# Patient Record
Sex: FEMALE | Race: WHITE
[De-identification: names, ages, dates, MRNs, and addresses within clinical notes are randomized per-mention and may not be internally consistent; named-entity substitution may affect disease eponyms.]

---

## 2019-04-03 NOTE — XMS
PreManage Notification: TEJA ECHOLS MRN:[id number]
 
Security Information
 
Security Events
No recent Security Events currently on file
 
 
 
CRITERIA MET
------------
- [facility]
 
 
CARE PROVIDERS
-------------------------------------------------------------------------------------
PRECIOUS WALTERS     Nurse Practitioner: Women's Health     Current
 
PHONE: [phone]
-------------------------------------------------------------------------------------
[facility]       Primary Care     2012-[facility]
 
PHONE: [phone]
-------------------------------------------------------------------------------------
Critical access hospital         Primary Care     [facility]
 
PHONE: [phone]
-------------------------------------------------------------------------------------
 
Guidelines Source: Merged with [facility]
Guidelines Date: 2016
 
Care Recommendation:
    [facility] EMERGENCY DEPARTMENT CARE 
    RECOMMENDATIONS FOR TEJA AAngelica ECHOLS, : 1995:     
    -Refer patient back to primary care provider for management of chronic 
    conditions or advise establishment of care with a primary care provider (see ED
    Transition Coordinator for resources).
    -*If patient has not been able to establish with a primary care provider, 
    provide brochure for [facility] walk-in clinics in [facility].
    -Consider the patient's recent work-ups when ordering lab and imaging tests. 
    Ionizing radiation studies, particularly CT scans of the head or abdomen
    shouldbe considered on a case by case basis, with careful review of new or
    changed symptoms and recent work-ups.
    -If patient is homeless, provide copies of Homeless Resources from the 
    Emergency Department web pages.
    -During every ED visit, confirm patients current home/mobile numbers in case 
    need to contact for laboratory results or for an RN discharge call.
 
 
E.D. VISIT COUNT (12 MO.)
-------------------------------------------------------------------------------------
2 [facility]
-------------------------------------------------------------------------------------
TOTAL 2
-------------------------------------------------------------------------------------
 
NOTE: Visits indicate total known visits.
 
ED/C VISIT TRACKING (12 MO.)
-------------------------------------------------------------------------------------
2019 00:34
[facility]
 
TYPE: Emergency
 
COMPLAINT:
- FACIAL RASH/VOMITING
-------------------------------------------------------------------------------------
2018 09:06
[facility]
 
TYPE: Emergency
 
COMPLAINT:
- LEG PAIN/NUMBNESS
 
DIAGNOSES:
- Lumbago with sciatica, left side
- Nicotine dependence, unspecified, uncomplicated
- Anesthesia of skin
-------------------------------------------------------------------------------------
 
 
INPATIENT VISIT TRACKING (12 MO.)
No inpatient visits to display in this time frame
 
[email or web address]

## 2020-03-16 NOTE — XMS
PreManage Notification: TEJA ECHOLS MRN:[id number]
 
Security Information
 
Security Events
No recent Security Events currently on file
 
 
 
CRITERIA MET
------------
- [facility]
 
 
CARE PROVIDERS
-------------------------------------------------------------------------------------
[facility] Federally Qualified        05/19/2012-Current
[facility]
 
PHONE: [phone]
-------------------------------------------------------------------------------------
[facility]er[facility] Qual[facility]        05/19/2012-Current
[facility]
 
PHONE: Unknown
-------------------------------------------------------------------------------------
PRECIOUS WALTERS      Nurse Practitioner: Women's Health     Current
 
PHONE: [phone]
-------------------------------------------------------------------------------------
[facility]       Primary Care     05/19/2012-Current
[facility]
 
PHONE: [phone]
-------------------------------------------------------------------------------------
[facility]
 
PHONE: [phone]
-------------------------------------------------------------------------------------
 
-------------------------------------------------------------------------
Care Guidelines exist for the following facilities:    
[facility] ( 07/08/2019 )
Care History
Medical/Surgical
04/03/2019    [facility]
 
      - PATIENT DOES NOT HAVE A PCP- PATIENT CANCELLED APT TO ESTABLISH CARE ON 01/ 30/19 WITH DR VILLA.
      - PLEASE REFER PATIENT TO THE WALK IN CLINIC FOR NON EMERGENT MEDICAL NEEDS. 
      - PLEASE PROVIDE W CONTACT NUMBER [phone].
E.D. VISIT COUNT (12 MO.)
-------------------------------------------------------------------------------------
2 [facility]
-------------------------------------------------------------------------------------
TOTAL 2
-------------------------------------------------------------------------------------
NOTE: Visits indicate total known visits.
 
 
ED/UCC VISIT TRACKING (12 MO.)
-------------------------------------------------------------------------------------
03/16/2020 11:30
[facility]
 
TYPE: Emergency
 
COMPLAINT:
- FEVER, COUGH
-------------------------------------------------------------------------------------
04/03/2019 00:34
[facility] [place]
 
TYPE: Emergency
 
COMPLAINT:
- FEVER FACIAL RASH/VOMITING
 
DIAGNOSES:
- Major depressive disorder, single episode, unspecified
- Fever, unspecified
- Urinary tract infection, site not specified
- Nausea with vomiting, unspecified
-------------------------------------------------------------------------------------
 
 
INPATIENT VISIT TRACKING (12 MO.)
No inpatient visits to display in this time frame
 
[email or web address]

## 2020-12-07 ENCOUNTER — HOSPITAL ENCOUNTER (EMERGENCY)
Dept: HOSPITAL 46 - ED | Age: 25
Discharge: HOME | End: 2020-12-07
Payer: COMMERCIAL

## 2020-12-07 VITALS — WEIGHT: 235 LBS | BODY MASS INDEX: 41.64 KG/M2 | HEIGHT: 63 IN

## 2020-12-07 DIAGNOSIS — S86.912A: Primary | ICD-10-CM
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
## 2022-09-22 ENCOUNTER — HOSPITAL ENCOUNTER (EMERGENCY)
Dept: HOSPITAL 46 - ED | Age: 27
Discharge: HOME | End: 2022-09-22
Payer: COMMERCIAL

## 2022-09-22 VITALS — BODY MASS INDEX: 42.7 KG/M2 | WEIGHT: 240.97 LBS | HEIGHT: 63 IN
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
## 2022-09-22 DIAGNOSIS — Z23: ICD-10-CM

## 2022-09-22 DIAGNOSIS — S60.222A: Primary | ICD-10-CM

## 2022-09-22 PROCEDURE — A9270 NON-COVERED ITEM OR SERVICE: HCPCS
